# Patient Record
Sex: FEMALE | Race: WHITE | NOT HISPANIC OR LATINO | Employment: OTHER | URBAN - METROPOLITAN AREA
[De-identification: names, ages, dates, MRNs, and addresses within clinical notes are randomized per-mention and may not be internally consistent; named-entity substitution may affect disease eponyms.]

---

## 2017-03-13 ENCOUNTER — AMBULATORY - HEALTHEAST (OUTPATIENT)
Dept: CARE COORDINATION | Facility: CLINIC | Age: 82
End: 2017-03-13

## 2017-03-13 ENCOUNTER — AMBULATORY - HEALTHEAST (OUTPATIENT)
Dept: CARDIOLOGY | Facility: CLINIC | Age: 82
End: 2017-03-13

## 2017-03-13 DIAGNOSIS — Z95.0 PACEMAKER: ICD-10-CM

## 2017-03-14 ENCOUNTER — COMMUNICATION - HEALTHEAST (OUTPATIENT)
Dept: CARDIOLOGY | Facility: CLINIC | Age: 82
End: 2017-03-14

## 2017-03-14 ENCOUNTER — AMBULATORY - HEALTHEAST (OUTPATIENT)
Dept: CARE COORDINATION | Facility: CLINIC | Age: 82
End: 2017-03-14

## 2017-03-15 ENCOUNTER — AMBULATORY - HEALTHEAST (OUTPATIENT)
Dept: CARE COORDINATION | Facility: CLINIC | Age: 82
End: 2017-03-15

## 2017-06-19 ENCOUNTER — AMBULATORY - HEALTHEAST (OUTPATIENT)
Dept: CARDIOLOGY | Facility: CLINIC | Age: 82
End: 2017-06-19

## 2017-06-19 DIAGNOSIS — Z95.0 PACEMAKER: ICD-10-CM

## 2017-06-20 LAB — HCC DEVICE COMMENTS: NORMAL

## 2017-09-07 ENCOUNTER — AMBULATORY - HEALTHEAST (OUTPATIENT)
Dept: CARDIOLOGY | Facility: CLINIC | Age: 82
End: 2017-09-07

## 2017-09-07 ENCOUNTER — OFFICE VISIT - HEALTHEAST (OUTPATIENT)
Dept: CARDIOLOGY | Facility: CLINIC | Age: 82
End: 2017-09-07

## 2017-09-07 DIAGNOSIS — Z95.0 CARDIAC PACEMAKER IN SITU: ICD-10-CM

## 2017-09-07 DIAGNOSIS — I49.5 SINOATRIAL NODE DYSFUNCTION (H): ICD-10-CM

## 2017-09-07 DIAGNOSIS — I10 ESSENTIAL HYPERTENSION WITH GOAL BLOOD PRESSURE LESS THAN 140/90: ICD-10-CM

## 2017-09-07 DIAGNOSIS — E78.5 HYPERLIPIDEMIA LDL GOAL <100: ICD-10-CM

## 2017-09-07 ASSESSMENT — MIFFLIN-ST. JEOR: SCORE: 861.7

## 2017-09-11 LAB — HCC DEVICE COMMENTS: NORMAL

## 2017-12-06 ENCOUNTER — AMBULATORY - HEALTHEAST (OUTPATIENT)
Dept: CARDIOLOGY | Facility: CLINIC | Age: 82
End: 2017-12-06

## 2017-12-06 DIAGNOSIS — Z95.0 CARDIAC PACEMAKER IN SITU: ICD-10-CM

## 2017-12-06 LAB — HCC DEVICE COMMENTS: NORMAL

## 2018-03-15 ENCOUNTER — AMBULATORY - HEALTHEAST (OUTPATIENT)
Dept: CARDIOLOGY | Facility: CLINIC | Age: 83
End: 2018-03-15

## 2018-03-15 DIAGNOSIS — Z95.0 CARDIAC PACEMAKER IN SITU: ICD-10-CM

## 2018-03-16 LAB — HCC DEVICE COMMENTS: NORMAL

## 2018-04-04 ENCOUNTER — COMMUNICATION - HEALTHEAST (OUTPATIENT)
Dept: CARDIOLOGY | Facility: CLINIC | Age: 83
End: 2018-04-04

## 2018-04-16 ENCOUNTER — COMMUNICATION - HEALTHEAST (OUTPATIENT)
Dept: CARDIOLOGY | Facility: CLINIC | Age: 83
End: 2018-04-16

## 2018-06-20 ENCOUNTER — AMBULATORY - HEALTHEAST (OUTPATIENT)
Dept: CARDIOLOGY | Facility: CLINIC | Age: 83
End: 2018-06-20

## 2018-06-20 DIAGNOSIS — Z95.0 CARDIAC PACEMAKER IN SITU: ICD-10-CM

## 2018-06-21 LAB
HCC DEVICE COMMENTS: NORMAL
HCC DEVICE IMPLANTING PROVIDER: NORMAL
HCC DEVICE MANUFACTURE: NORMAL
HCC DEVICE MODEL: NORMAL
HCC DEVICE SERIAL NUMBER: NORMAL
HCC DEVICE TYPE: NORMAL

## 2018-09-13 ENCOUNTER — AMBULATORY - HEALTHEAST (OUTPATIENT)
Dept: CARDIOLOGY | Facility: CLINIC | Age: 83
End: 2018-09-13

## 2018-09-20 ENCOUNTER — AMBULATORY - HEALTHEAST (OUTPATIENT)
Dept: CARDIOLOGY | Facility: CLINIC | Age: 83
End: 2018-09-20

## 2018-09-20 ENCOUNTER — OFFICE VISIT - HEALTHEAST (OUTPATIENT)
Dept: CARDIOLOGY | Facility: CLINIC | Age: 83
End: 2018-09-20

## 2018-09-20 DIAGNOSIS — I10 ESSENTIAL HYPERTENSION: ICD-10-CM

## 2018-09-20 DIAGNOSIS — I49.5 SINOATRIAL NODE DYSFUNCTION (H): ICD-10-CM

## 2018-09-20 DIAGNOSIS — Z95.0 CARDIAC PACEMAKER IN SITU: ICD-10-CM

## 2018-09-20 DIAGNOSIS — I48.0 PAROXYSMAL ATRIAL FIBRILLATION (H): ICD-10-CM

## 2018-09-20 RX ORDER — FUROSEMIDE 20 MG
10 TABLET ORAL DAILY
Status: SHIPPED | COMMUNITY
Start: 2018-09-04

## 2018-09-20 ASSESSMENT — MIFFLIN-ST. JEOR: SCORE: 864.42

## 2018-10-04 ENCOUNTER — HOSPITAL ENCOUNTER (OUTPATIENT)
Dept: CARDIOLOGY | Facility: HOSPITAL | Age: 83
Discharge: HOME | End: 2018-10-04
Attending: INTERNAL MEDICINE

## 2018-10-04 DIAGNOSIS — I48.0 PAROXYSMAL ATRIAL FIBRILLATION (H): ICD-10-CM

## 2018-10-04 ASSESSMENT — MIFFLIN-ST. JEOR: SCORE: 864.42

## 2018-10-05 LAB
AORTIC ROOT: 3 CM
AORTIC VALVE MEAN VELOCITY: 115 CM/S
AV CUSP SEPERATION: 1.6 CM
AV CUSP SEPERATION: 1.6 CM
AV DIMENSIONLESS INDEX VTI: 0.5
AV MEAN GRADIENT: 6 MMHG
AV PEAK GRADIENT: 9.6 MMHG
AV VALVE AREA: 1.9 CM2
BSA FOR ECHO PROCEDURE: 1.54 M2
CV ECHO HEIGHT: 60 IN
CV ECHO WEIGHT: 123 LBS
DOP CALC AO PEAK VEL: 155 CM/S
DOP CALC AO VTI: 37.5 CM
DOP CALC LVOT AREA: 3.8 CM2
DOP CALC LVOT DIAMETER: 2.2 CM
DOP CALC LVOT STROKE VOLUME: 69.5 CM3
DOP CALC MV VTI: 30.2 CM
DOP CALCLVOT PEAK VEL VTI: 18.3 CM
EJECTION FRACTION: 57 % (ref 55–75)
FRACTIONAL SHORTENING: 30.4 % (ref 28–44)
INTERVENTRICULAR SEPTUM IN END DIASTOLE: 1.1 CM (ref 0.6–0.9)
IVS/PW RATIO: 0.9
LA AREA 1: 20 CM2
LA AREA 2: 18.7 CM2
LEFT ATRIUM LENGTH: 4.79 CM
LEFT ATRIUM SIZE: 3.5 CM
LEFT ATRIUM VOLUME INDEX: 43.1 ML/M2
LEFT ATRIUM VOLUME: 66.4 ML
LEFT VENTRICLE CARDIAC INDEX: 3.7 L/MIN/M2
LEFT VENTRICLE CARDIAC OUTPUT: 5.7 L/MIN
LEFT VENTRICLE DIASTOLIC VOLUME INDEX: 42.2 CM3/M2 (ref 34–74)
LEFT VENTRICLE DIASTOLIC VOLUME: 65 CM3 (ref 46–106)
LEFT VENTRICLE HEART RATE: 82 BPM
LEFT VENTRICLE MASS INDEX: 125.3 G/M2
LEFT VENTRICLE SYSTOLIC VOLUME INDEX: 18.2 CM3/M2 (ref 11–31)
LEFT VENTRICLE SYSTOLIC VOLUME: 28 CM3 (ref 14–42)
LEFT VENTRICULAR INTERNAL DIMENSION IN DIASTOLE: 4.6 CM (ref 3.8–5.2)
LEFT VENTRICULAR INTERNAL DIMENSION IN SYSTOLE: 3.2 CM (ref 2.2–3.5)
LEFT VENTRICULAR MASS: 192.9 G
LEFT VENTRICULAR OUTFLOW TRACT MEAN GRADIENT: 2 MMHG
LEFT VENTRICULAR OUTFLOW TRACT MEAN VELOCITY: 62.9 CM/S
LEFT VENTRICULAR POSTERIOR WALL IN END DIASTOLE: 1.2 CM (ref 0.6–0.9)
LV STROKE VOLUME INDEX: 45.1 ML/M2
MITRAL VALVE DECELERATION SLOPE: 3200 MM/S2
MITRAL VALVE E/A RATIO: 0.7
MITRAL VALVE MEAN INFLOW VELOCITY: 59.2 CM/S
MITRAL VALVE PEAK VELOCITY: 112 CM/S
MITRAL VALVE PRESSURE HALF-TIME: 73 MS
MR PISA RADIUS: 0.5 CM
MR PISA VN NYQUIST: 30.8 CM/S
MV AREA VTI: 2.3 CM2
MV AVERAGE E/E' RATIO: 17.8 CM/S
MV DECELERATION TIME: 169 MS
MV E'TISSUE VEL-LAT: 4.68 CM/S
MV E'TISSUE VEL-MED: 3.41 CM/S
MV LATERAL E/E' RATIO: 15.4
MV MEAN GRADIENT: 2 MMHG
MV MEDIAL E/E' RATIO: 21.1
MV PEAK A VELOCITY: 106 CM/S
MV PEAK E VELOCITY: 72.1 CM/S
MV PEAK GRADIENT: 5 MMHG
MV VALVE AREA BY CONTINUITY EQUATION: 2.3 CM2
MV VALVE AREA PRESSURE 1/2 METHOD: 3 CM2
NUC REST DIASTOLIC VOLUME INDEX: 1968 LBS
NUC REST SYSTOLIC VOLUME INDEX: 60 IN
TRICUSPID REGURGITATION PEAK PRESSURE GRADIENT: 25 MMHG
TRICUSPID VALVE ANULAR PLANE SYSTOLIC EXCURSION: 1.8 CM
TRICUSPID VALVE PEAK REGURGITANT VELOCITY: 250 CM/S

## 2018-10-10 ENCOUNTER — COMMUNICATION - HEALTHEAST (OUTPATIENT)
Dept: CARDIOLOGY | Facility: CLINIC | Age: 83
End: 2018-10-10

## 2018-12-19 ENCOUNTER — AMBULATORY - HEALTHEAST (OUTPATIENT)
Dept: CARDIOLOGY | Facility: CLINIC | Age: 83
End: 2018-12-19

## 2018-12-19 DIAGNOSIS — Z95.0 CARDIAC PACEMAKER IN SITU: ICD-10-CM

## 2019-03-26 ENCOUNTER — AMBULATORY - HEALTHEAST (OUTPATIENT)
Dept: CARDIOLOGY | Facility: CLINIC | Age: 84
End: 2019-03-26

## 2019-03-26 DIAGNOSIS — Z95.0 CARDIAC PACEMAKER IN SITU: ICD-10-CM

## 2019-06-27 ENCOUNTER — AMBULATORY - HEALTHEAST (OUTPATIENT)
Dept: CARDIOLOGY | Facility: CLINIC | Age: 84
End: 2019-06-27

## 2019-06-27 DIAGNOSIS — Z95.0 CARDIAC PACEMAKER IN SITU: ICD-10-CM

## 2019-08-30 ENCOUNTER — AMBULATORY - HEALTHEAST (OUTPATIENT)
Dept: CARDIOLOGY | Facility: CLINIC | Age: 84
End: 2019-08-30

## 2019-09-09 ENCOUNTER — AMBULATORY - HEALTHEAST (OUTPATIENT)
Dept: CARDIOLOGY | Facility: CLINIC | Age: 84
End: 2019-09-09

## 2019-09-09 ENCOUNTER — OFFICE VISIT - HEALTHEAST (OUTPATIENT)
Dept: CARDIOLOGY | Facility: CLINIC | Age: 84
End: 2019-09-09

## 2019-09-09 DIAGNOSIS — I10 ESSENTIAL HYPERTENSION: ICD-10-CM

## 2019-09-09 DIAGNOSIS — I49.5 SINOATRIAL NODE DYSFUNCTION (H): ICD-10-CM

## 2019-09-09 DIAGNOSIS — Z95.0 CARDIAC PACEMAKER IN SITU: ICD-10-CM

## 2019-09-09 ASSESSMENT — MIFFLIN-ST. JEOR
SCORE: 891.64
SCORE: 891.64

## 2019-10-24 ENCOUNTER — SURGERY - HEALTHEAST (OUTPATIENT)
Dept: GASTROENTEROLOGY | Facility: HOSPITAL | Age: 84
End: 2019-10-24

## 2019-10-24 ASSESSMENT — MIFFLIN-ST. JEOR: SCORE: 896.18

## 2019-12-04 ENCOUNTER — AMBULATORY - HEALTHEAST (OUTPATIENT)
Dept: CARDIOLOGY | Facility: CLINIC | Age: 84
End: 2019-12-04

## 2019-12-04 DIAGNOSIS — I49.5 SINOATRIAL NODE DYSFUNCTION (H): ICD-10-CM

## 2019-12-04 DIAGNOSIS — Z95.0 CARDIAC PACEMAKER IN SITU: ICD-10-CM

## 2020-03-05 ENCOUNTER — AMBULATORY - HEALTHEAST (OUTPATIENT)
Dept: CARDIOLOGY | Facility: CLINIC | Age: 85
End: 2020-03-05

## 2020-03-05 DIAGNOSIS — Z95.0 CARDIAC PACEMAKER IN SITU: ICD-10-CM

## 2020-03-05 DIAGNOSIS — I49.5 SINOATRIAL NODE DYSFUNCTION (H): ICD-10-CM

## 2020-06-09 ENCOUNTER — AMBULATORY - HEALTHEAST (OUTPATIENT)
Dept: CARDIOLOGY | Facility: CLINIC | Age: 85
End: 2020-06-09

## 2020-06-09 DIAGNOSIS — I49.5 SINOATRIAL NODE DYSFUNCTION (H): ICD-10-CM

## 2020-06-09 DIAGNOSIS — Z95.0 CARDIAC PACEMAKER IN SITU: ICD-10-CM

## 2020-07-14 ENCOUNTER — COMMUNICATION - HEALTHEAST (OUTPATIENT)
Dept: ADMINISTRATIVE | Facility: CLINIC | Age: 85
End: 2020-07-14

## 2020-10-14 ENCOUNTER — AMBULATORY - HEALTHEAST (OUTPATIENT)
Dept: CARDIOLOGY | Facility: CLINIC | Age: 85
End: 2020-10-14

## 2020-10-14 DIAGNOSIS — Z95.0 CARDIAC PACEMAKER IN SITU: ICD-10-CM

## 2020-10-14 DIAGNOSIS — I49.5 SINOATRIAL NODE DYSFUNCTION (H): ICD-10-CM

## 2021-01-14 ENCOUNTER — AMBULATORY - HEALTHEAST (OUTPATIENT)
Dept: CARDIOLOGY | Facility: CLINIC | Age: 86
End: 2021-01-14

## 2021-01-14 DIAGNOSIS — Z95.0 CARDIAC PACEMAKER IN SITU: ICD-10-CM

## 2021-01-14 DIAGNOSIS — I49.5 SINOATRIAL NODE DYSFUNCTION (H): ICD-10-CM

## 2021-04-16 ENCOUNTER — AMBULATORY - HEALTHEAST (OUTPATIENT)
Dept: CARDIOLOGY | Facility: CLINIC | Age: 86
End: 2021-04-16

## 2021-04-16 DIAGNOSIS — I49.5 SINOATRIAL NODE DYSFUNCTION (H): ICD-10-CM

## 2021-04-16 DIAGNOSIS — Z95.0 CARDIAC PACEMAKER IN SITU: ICD-10-CM

## 2021-05-14 ENCOUNTER — AMBULATORY - HEALTHEAST (OUTPATIENT)
Dept: CARDIOLOGY | Facility: CLINIC | Age: 86
End: 2021-05-14

## 2021-05-14 DIAGNOSIS — Z95.0 CARDIAC PACEMAKER IN SITU: ICD-10-CM

## 2021-05-14 DIAGNOSIS — I49.5 SINOATRIAL NODE DYSFUNCTION (H): ICD-10-CM

## 2021-05-14 RX ORDER — PANTOPRAZOLE SODIUM 20 MG/1
1 TABLET, DELAYED RELEASE ORAL DAILY
Status: SHIPPED | COMMUNITY
Start: 2021-03-29

## 2021-05-14 RX ORDER — MIRTAZAPINE 7.5 MG/1
1 TABLET, FILM COATED ORAL DAILY
Status: SHIPPED | COMMUNITY
Start: 2021-03-19

## 2021-05-14 ASSESSMENT — MIFFLIN-ST. JEOR: SCORE: 914.32

## 2021-05-27 VITALS
BODY MASS INDEX: 26.31 KG/M2 | RESPIRATION RATE: 16 BRPM | HEIGHT: 60 IN | SYSTOLIC BLOOD PRESSURE: 142 MMHG | WEIGHT: 134 LBS | HEART RATE: 72 BPM | DIASTOLIC BLOOD PRESSURE: 72 MMHG

## 2021-05-31 VITALS — BODY MASS INDEX: 24.03 KG/M2 | WEIGHT: 122.4 LBS | HEIGHT: 60 IN

## 2021-06-01 NOTE — PROGRESS NOTES
[facility] Progress Note    Assessment:  1.  Sick sinus syndrome with pacemaker implantation.  Clinically doing well normal pacemaker function.  She has had previous infrequent episodes of atrial fibrillation with documented 4-hour episode.  On this occasion she really has not had any significant atrial fibrillation.  She has had rare branches of nonsustained ventricular tachycardia.  We pursued an echocardiogram 1 year ago that demonstrated normal systolic function.  We did discuss whether we would pursue nuclear stress testing.  Her preference was not to pursue additional testing at this time.    2.Hypertension.  Blood pressure was elevated upon arrival but improved after she is been seated rechecked.  She does have her pressure monitored at her place as of residence.      Plan: Follow-up 1 year with plans as outlined above.    1. Hypertension     2. Sick Sinus Syndrome     3. Cardiac pacemaker in situ           An After Visit Summary was printed and given to the patient.    Subjective:    Destinee Green is a 89 y.o. female who returned for a planned  follow up visit.  She is here for follow-up pacemaker check.  Her pacemaker interrogation today demonstrates normal pacemaker function.  She has had a rare occurrence of tacky arrhythmia.  There was one report of 18 beats of faster heart rhythm possibly VT March 5, 2019.  She has had no complaints of chest discomfort, shortness of breath, orthopnea, dizziness, lightheadedness, syncope or near syncope.  She did undergo echocardiography October 2019 that demonstrated preserved systolic function and no significant valve abnormality.  I have reviewed notes and laboratory studies from her primary care physician's office.  Reviewed her medications as there has been some medication changes by her primary care physician.  She is no longer taking statins at the recommendation of her primary care physician.    Review of Systems:                                               Problem List:    Patient Active Problem List   Diagnosis     Hyperlipidemia LDL goal <100     Hypertension     Sick Sinus Syndrome     Cardiac pacemaker in situ       Social History     Socioeconomic History     Marital status: Single     Spouse name: Not on file     Number of children: Not on file     Years of education: Not on file     Highest education level: Not on file   Occupational History     Not on file   Social Needs     Financial resource strain: Not on file     Food insecurity:     Worry: Not on file     Inability: Not on file     Transportation needs:     Medical: Not on file     Non-medical: Not on file   Tobacco Use     Smoking status: Former Smoker     Smokeless tobacco: Never Used   Substance and Sexual Activity     Alcohol use: Not on file     Drug use: Not on file     Sexual activity: Not on file   Lifestyle     Physical activity:     Days per week: Not on file     Minutes per session: Not on file     Stress: Not on file   Relationships     Social connections:     Talks on phone: Not on file     Gets together: Not on file     Attends Sikh service: Not on file     Active member of club or organization: Not on file     Attends meetings of clubs or organizations: Not on file     Relationship status: Not on file     Intimate partner violence:     Fear of current or ex partner: Not on file     Emotionally abused: Not on file     Physically abused: Not on file     Forced sexual activity: Not on file   Other Topics Concern     Not on file   Social History Narrative     Not on file       No family history on file.    Current Outpatient Medications   Medication Sig Dispense Refill     ascorbic acid (ASCORBIC ACID WITH TEO HIPS) 500 MG tablet Take 500 mg by mouth daily.       aspirin 81 MG EC tablet Take 81 mg by mouth daily.       CALCIUM CARBONATE (CALCIUM 500 ORAL) Take 500 mg by mouth 2 (two) times a day.       ferrous sulfate 325 (65 FE) MG tablet Take 325 mg by mouth 3 (three)  times a week.        furosemide (LASIX) 20 MG tablet Take 20 mg by mouth daily.              metoprolol succinate (TOPROL-XL) 100 MG 24 hr tablet Take 100 mg by mouth daily.       VIT C/E/ZN/COPPR/LUTEIN/ZEAXAN (PRESERVISION AREDS 2 ORAL) Take by mouth 2 (two) times a day.       atenolol (TENORMIN) 50 MG tablet Take 50 mg by mouth daily.       hydrALAZINE (APRESOLINE) 25 MG tablet Take 25 mg by mouth 3 (three) times a day.       hydroCHLOROthiazide (HYDRODIURIL) 25 MG tablet Take 25 mg by mouth daily.       hydrochlorothiazide (MICROZIDE) 12.5 mg capsule Take 12.5 mg by mouth daily.       multivitamin with minerals (VITAMINS & MINERALS) tablet Take by mouth daily.       potassium chloride (K-DUR,KLOR-CON) 20 MEQ tablet Take 20 mEq by mouth daily.       raloxifene (EVISTA) 60 mg tablet Take 60 mg by mouth daily.       simvastatin (ZOCOR) 20 MG tablet Take 20 mg by mouth daily.       No current facility-administered medications for this visit.        Objective:     /70 (Patient Site: Left Arm, Patient Position: Sitting, Cuff Size: Adult Regular)   Pulse 69   Resp 14   Ht 5' (1.524 m)   Wt 129 lb (58.5 kg)   BMI 25.19 kg/m    129 lb (58.5 kg)   140/70    Physical Exam:    GENERAL APPEARANCE: alert, no apparent distress  HEENT: no scleral icterus or xanthelasma  NECK: jugular venous pressure not obviously elevated although examined in the chair.  CHEST: symmetric, the lungs are clear to auscultation, pacemaker site well-healed  CARDIOVASCULAR: regular rhythm, soft systolic murmur, click, or gallop; no carotid bruits  Abdomen: No Organomegaly, masses, bruits, or tenderness. Bowels sounds are present      EXTREMITIES: no cyanosis, clubbing, trace edema she has a port hose on that are thigh-high and she did not wish to have them removed but she is reporting some intermittent discoloration of her foot right greater than left without pain.  My examination suggests some mild coloration likely related to venous  sufficiency.    Cardiac Testing:  Echo Complete   Order# [id number]   Reading physician: Kendall Ho MD Ordering physician: Kumar Timmons MD Study date: 10/4/18   Performing Date Performing Department   Oct 4, 2018  CARDIAC TESTING [[id number]]   Patient Information     Patient Name  Destinee Green MRN  [id number] Sex  Female              Age  1923 (89 y.o.)   Indications     Dx: Paroxysmal atrial fibrillation (H) [I48.0 (ICD-10-CM)]   Summary       When compared to the previous study dated 2016, no significant change.    Left ventricle ejection fraction is normal. The calculated left ventricular ejection fraction is 57%.    Normal left ventricular size and systolic function.    Normal right ventricular size and systolic function.    Mild mitral regurgitation    Pacemaker lead in right heart chambers              Lab Results:  2019 outside hospital 142, potassium 4.9, chloride 106, CO2 29, BUN 31, creatinine 0.89 white count 8.3, hemoglobin 14.8 platelets 266            This note has been dictated using voice recognition software. Any grammatical or context distortions are unintentional and inherent to the software.      Kumar Timmons M.D., F.A.C.[facility]  [phone]

## 2021-06-01 NOTE — PATIENT INSTRUCTIONS - HE
Please call my nurse Shannan with any heart related questions.Her number is [phone].Please call if blood pressure is consistently higher than 140/90.Please have blood pressure checked after being seated for at least 10 minutes.

## 2021-06-02 VITALS — WEIGHT: 123 LBS | BODY MASS INDEX: 24.15 KG/M2 | HEIGHT: 60 IN

## 2021-06-02 VITALS — WEIGHT: 123 LBS | HEIGHT: 60 IN | BODY MASS INDEX: 24.15 KG/M2

## 2021-06-03 VITALS
DIASTOLIC BLOOD PRESSURE: 70 MMHG | BODY MASS INDEX: 25.32 KG/M2 | SYSTOLIC BLOOD PRESSURE: 160 MMHG | WEIGHT: 129 LBS | HEART RATE: 69 BPM | HEIGHT: 60 IN | RESPIRATION RATE: 14 BRPM

## 2021-06-03 VITALS
HEART RATE: 69 BPM | BODY MASS INDEX: 25.32 KG/M2 | RESPIRATION RATE: 14 BRPM | HEIGHT: 60 IN | WEIGHT: 129 LBS | SYSTOLIC BLOOD PRESSURE: 160 MMHG | DIASTOLIC BLOOD PRESSURE: 70 MMHG

## 2021-06-03 VITALS — WEIGHT: 130 LBS | HEIGHT: 60 IN | BODY MASS INDEX: 25.52 KG/M2

## 2021-06-12 NOTE — PROGRESS NOTES
In clinic device check with Device Nurse, followed by clinic follow-up with Dr. Timmons.  Please see link for full device report.  Patient was informed of results and next follow up during today's visit..

## 2021-06-12 NOTE — PROGRESS NOTES
[facility] Progress Note    Assessment:  1.  Sick sinus syndrome status post pacemaker implantation in 2013.  Clinically doing well.  Pacemaker check today demonstrates normal pacemaker function.  She has infrequent episodes of nonsustained ventricular tachycardia that have been previously noted.  She had an echocardiogram approximately 1 year ago that demonstrated normal left ventricular systolic function.  We discussed the potential for stress testing to exclude occult ischemia related to this finding but she continues to be reluctant to consider stress testing and will continue with her current combination of medications.  She is asked to update us with complaints of symptomatic dizziness or palpitations.  She is asked to update us with any progressive change in exercise tolerance.    2.  Hypertension blood pressure appears to be under good control with her current combination of medications.  She is asked to have her blood pressure monitored periodically.      3.  Hyperlipidemia.  The most recent lipid studies are reviewed from her primary care physician's office.  This was completed recently September 1, 2017 with an LDL cholesterol 64.  We discussed whether we would discontinue simvastatin as she is on it for primary prevention but her references to remain on her current combination of medications.        Plan: As outlined above with plan follow-up in 6 months    1. Cardiac pacemaker in situ     2. Hyperlipidemia LDL goal <100     3. Essential hypertension with goal blood pressure less than 140/90     4. Sick Sinus Syndrome           An After Visit Summary was printed and given to the patient.    Subjective:    Destinee Green is a 89 y.o. female who returned for a planned  follow up.  She reports that overall she feels well.  She has noted mild decrease in her exercise tolerance and has some gait instability but has not fallen.  She denies chest discomfort, orthopnea, or PND.  Pacemaker  check today demonstrates normal pacemaker function.  She has had infrequent nonsustained ventricular tachycardic events.  These have been asymptomatic.  One year ago we pursued an echocardiogram that demonstrated normal left ventricular systolic function.  We discussed the possibility of stress testing to exclude ischemia both previously and again today but at this time she remains reluctant given her asymptomatic state.  Recent laboratory studies from her primary care physician's office are reviewed.  History profile was within normal limits.  CBC within normal limits.  Liver function tests were within normal limits and total cholesterol 143 with an LDL of 64.  We did discuss the potential for stopping statin given her age and primary prevention but her preference is to remain on her medications.  She reports her blood pressures have been under reasonable control.    Review of Systems:   General: WNL  Eyes: WNL  Ears/Nose/Throat: WNL  Lungs: WNL  Heart: Shortness of Breath with activity  Stomach: WNL  Bladder: WNL  Muscle/Joints: Joint Pain, Muscle Pain  Skin: WNL  Nervous System: Daytime Sleepiness  Mental Health: WNL     Blood: Easy Bruising      Problem List:    Patient Active Problem List   Diagnosis     Hyperlipidemia LDL goal <100     Hypertension     Sick Sinus Syndrome     Cardiac pacemaker in situ       Social History     Social History     Marital status: Single     Spouse name: N/A     Number of children: N/A     Years of education: N/A     Occupational History     Not on file.     Social History Main Topics     Smoking status: Former Smoker     Smokeless tobacco: Not on file     Alcohol use Not on file     Drug use: Not on file     Sexual activity: Not on file     Other Topics Concern     Not on file     Social History Narrative       No family history on file.    Current Outpatient Prescriptions   Medication Sig Dispense Refill     ascorbic acid (ASCORBIC ACID WITH TEO HIPS) 500 MG tablet Take 500 mg  by mouth daily.       aspirin 81 MG EC tablet Take 81 mg by mouth daily.       atenolol (TENORMIN) 50 MG tablet Take 50 mg by mouth daily.       CALCIUM CARBONATE (CALCIUM 500 ORAL) Take 500 mg by mouth 2 (two) times a day.       ferrous sulfate 325 (65 FE) MG tablet Take 325 mg by mouth 3 (three) times a week.        hydrALAZINE (APRESOLINE) 25 MG tablet Take 25 mg by mouth 3 (three) times a day.       hydroCHLOROthiazide (HYDRODIURIL) 25 MG tablet Take 25 mg by mouth daily.       raloxifene (EVISTA) 60 mg tablet Take 60 mg by mouth daily.       simvastatin (ZOCOR) 20 MG tablet Take 20 mg by mouth daily.       VIT C/E/ZN/COPPR/LUTEIN/ZEAXAN (PRESERVISION AREDS 2 ORAL) Take by mouth 2 (two) times a day.       hydrochlorothiazide (MICROZIDE) 12.5 mg capsule Take 12.5 mg by mouth daily.       multivitamin with minerals (VITAMINS & MINERALS) tablet Take by mouth daily.       No current facility-administered medications for this visit.        Objective:     /58 (Patient Site: Left Arm, Patient Position: Sitting, Cuff Size: Adult Regular)  Pulse 84  Resp 16  Ht 5' (1.524 m)  Wt 122 lb 6.4 oz (55.5 kg)  BMI 23.9 kg/m2  122 lb 6.4 oz (55.5 kg)       Physical Exam:    GENERAL APPEARANCE: alert, no apparent distress  HEENT: no scleral icterus or xanthelasma  NECK: jugular venous pressure within normal limits  CHEST: symmetric, the lungs are clear to auscultation  CARDIOVASCULAR: regular rhythm with soft systolic murmur, click, or gallop; no carotid bruits, pacemaker site well-healed  Abdomen: No Organomegaly, masses, bruits, or tenderness. Bowels sounds are present      EXTREMITIES: no cyanosis, clubbing or edema    Cardiac Testing:  Procedure Date  Date: 09/26/2016 Start: 01:14 AM End: 01:35 AM     Study Location: [facility]  Technical Quality: Adequate visualization     Patient Status: Routine     Height: 60 inches Weight: 127 pounds BSA: 1.54 m^2 BMI: 24.8 kg/m^2     HR: 60 bpm BP: 170/80 mmHg     Allergies    -  No known allergies.     Indications  Hypertension and sick sinus syndrome.      Conclusions       Summary   1. Normal left ventricular size and systolic performance. The ejection   fraction is estimated to be 55%.   2. No significant valvular heart disease is identified on this study.   3. The left atrium is mildly enlarged.       When compared to the prior real-time echocardiogram dated 14 December 2012, there has been no major interval change.    IMPRESSION:   CONCLUSION:  1.  Mild atheromatous plaque in the carotid arteries.  2.  No significant stenosis on either side.     Evaluation based on velocities and NASCET criteria.           Lab Results:    No results found for: NA, K, CL, CO2, BUN, CREATININE, GLUCOSE, CALCIUM  No results found for: CHOL, TRIG, HDL, LDLDIRECT  No results found for: BNP  No results found for: CREATININE  No results found for: LDLCALC  No results found for: WBC, HGB, HCT, MCV, PLT            This note has been dictated using voice recognition software. Any grammatical or context distortions are unintentional and inherent to the software.      Kumar Timmons M.D., F.A.C.C.  [facility]  [phone]

## 2021-06-16 PROBLEM — Z95.0 CARDIAC PACEMAKER IN SITU: Status: ACTIVE | Noted: 2017-09-07

## 2021-06-17 NOTE — PROGRESS NOTES
In clinic device check.  Please see link for full device report.  Patient was informed of results and next follow up during today's visit.    CHRISTI AndersonN, RN, CV-BC  Device Nurse  [facility]

## 2021-06-19 NOTE — LETTER
Letter by Xochitl Richey RDCS at      Author: Xochitl Richey RDCS Service: -- Author Type: --    Filed:  Encounter Date: 3/26/2019 Status: (Other)         Destinee Green  [address]  [address]  [place] [address]      March 26, 2019      Dear Ms. Green,    RE: Remote Results    We are writing to you regarding your recent Remote Pacemaker check from home. Your transmission was received successfully. Battery status is satisfactory at this time.     Your results are being reviewed.    Your next device appointment will be a remote check on June 27, 2019.  You can choose the time of day you wish to transmit.    To schedule or reschedule, please call [phone] and press 1.    NOTE: If you would like to do an extra transmission, please call [phone] and press 3 to speak to a nurse BEFORE transmitting. This ensures that the Device Clinic staff is aware of the reason you are sending a transmission, and can follow-up with you after it has been reviewed.    We will be checking your implanted device from home (remotely) every three months unless otherwise instructed. We will need to see you in the clinic at least once a year. You may need to be seen in the clinic sooner depending on the results of your check.    Please be aware:    The follow-up schedule is like a Physician prescription.    Your remote monitor is paired to your specific implanted device.      Sincerely,    [facility] Device Clinic

## 2021-06-19 NOTE — LETTER
Letter by Марина Love EPS at      Author: Марина Love EPS Service: -- Author Type: --    Filed:  Encounter Date: 6/27/2019 Status: (Other)         Destinee Green  [address]  [address]  [place] [address]      June 27, 2019      Dear Ms. Green,    RE: Remote Results    We are writing to you regarding your recent Remote Pacemaker check from home. Your transmission was received successfully. Battery status is satisfactory at this time.     Your results are within normal limits.    Your next device appointment will be a clinic visit.  Please call in July to schedule.      To schedule or reschedule, please call [phone] and press 1.    NOTE: If you would like to do an extra transmission, please call [phone] and press 3 to speak to a nurse BEFORE transmitting. This ensures that the Device Clinic staff is aware of the reason you are sending a transmission, and can follow-up with you after it has been reviewed.    We will be checking your implanted device from home (remotely) every three months unless otherwise instructed. We will need to see you in the clinic at least once a year. You may need to be seen in the clinic sooner depending on the results of your check.    Please be aware:    The follow-up schedule is like a Physician prescription.    Your remote monitor is paired to your specific implanted device.      Sincerely,    [facility] Device Clinic

## 2021-06-19 NOTE — LETTER
Letter by Meagan Miller at      Author: Meagan Miller Service: -- Author Type: --    Filed:  Encounter Date: 12/4/2019 Status: Signed         Destinee Green  [address] [place] [id number]      December 4, 2019      Dear MsCydney Green,    RE: Remote Results    We are writing to you regarding your recent Remote Pacemaker check from home. Your transmission was received successfully. Battery status is satisfactory at this time.     Your results are showing no significant changes.    Your next device appointment will be a remote check on March 5, 2020.  You can choose the time of day you wish to transmit.    To schedule or reschedule, please call [phone] and press 1.    NOTE: If you would like to do an extra transmission, please call [phone] and press 3 to speak to a nurse BEFORE transmitting. This ensures that the Device Clinic staff is aware of the reason you are sending a transmission, and can follow-up with you after it has been reviewed.    We will be checking your implanted device from home (remotely) every three months unless otherwise instructed. We will need to see you in the clinic at least once a year. You may need to be seen in the clinic sooner depending on the results of your check.    Please be aware:    The follow-up schedule is like a Physician prescription.    Your remote monitor is paired to your specific implanted device.      Sincerely,    [facility] Device Clinic

## 2021-06-20 NOTE — PROGRESS NOTES
In clinic device check with Dr. Timmons.  Please see link for full device report.  Patient was informed of results and next follow up during today's visit.

## 2021-06-20 NOTE — PROGRESS NOTES
[facility] Progress Note    Assessment:  1.  Sick sinus syndrome with pacemaker implantation 2013.  Clinically doing well.  Pacemaker interrogation is outlined below.  She had one 4-hour episode of atrial fibrillation January 2018 with otherwise we will and ventricular paced rhythm.  She does ventricular paced 99% of the time.  We talked about the risk of stroke associated with atrial fibrillation.  We discussed that currently she has a very infrequent occurrence or documentation of atrial fibrillation.  Her preference is continue with her current medication regimen which includes an 81 mg aspirin.  She is aware of the potential increased risk of stroke and will reconsider her options if she has documented more frequent episodes of atrial fibrillation.  She is not aware clinically of atrial fibrillation.    2.  Hypertension.  Blood pressures appear to be under good control.  I note that she was changed to furosemide off hydrochlorothiazide.    3.  Dyslipidemia.  She has been off statins at her advanced age and recommendation of her primary care physician.      Plan: Follow-up 1 year with planned echocardiogram to reevaluate left ventricular function given 99% ventricular paced.    1. Paroxysmal atrial fibrillation (H)  Echo Complete   2. Hypertension     3. Sick Sinus Syndrome     4. Cardiac pacemaker in situ           An After Visit Summary was printed and given to the patient.    Subjective:    Destinee Green is a 89 y.o. female who returned for a planned  follow up visit.  She is here for her yearly pacemaker check.  She tells me she is been feeling well.  She did have an issue with a respiratory infection in April 2018 and she took a while to recover from this.  She tells me that she is still in the transitional care unit but is feeling well.  She denies chest pain, dizziness, significant shortness of breath, orthopnea or PND.  Pacemaker interrogation today demonstrates 1 4-hour episode of  atrial fibrillation January 2018 and one 9 beat nonsustained ventricular tachycardia October 2017.  Most recent echocardiogram is from September 2018 where left ventricular function was normal.  Discussed in the past and again today the risk of stroke associated with atrial fibrillation.  She has had very infrequent atrial fibrillation but did have a 4-hour episode which does give her some essential increased risk of stroke.  We talked about anticoagulation and we talked about the watchman device.  Her preference was to remain on aspirin and monitor for more frequent episodes of atrial fibrillation which I think seems reasonable.    Review of Systems:   General: WNL  Eyes: WNL  Ears/Nose/Throat: WNL  Lungs: WNL  Heart: WNL  Stomach: WNL  Bladder: WNL  Muscle/Joints: WNL  Skin: WNL  Nervous System: WNL  Mental Health: WNL     Blood: WNL      Problem List:    Patient Active Problem List   Diagnosis     Hyperlipidemia LDL goal <100     Hypertension     Sick Sinus Syndrome     Cardiac pacemaker in situ       Social History     Social History     Marital status: Single     Spouse name: N/A     Number of children: N/A     Years of education: N/A     Occupational History     Not on file.     Social History Main Topics     Smoking status: Former Smoker     Smokeless tobacco: Never Used     Alcohol use Not on file     Drug use: Not on file     Sexual activity: Not on file     Other Topics Concern     Not on file     Social History Narrative       No family history on file.    Current Outpatient Prescriptions   Medication Sig Dispense Refill     ascorbic acid (ASCORBIC ACID WITH TEO HIPS) 500 MG tablet Take 500 mg by mouth daily.       aspirin 81 MG EC tablet Take 81 mg by mouth daily.       CALCIUM CARBONATE (CALCIUM 500 ORAL) Take 500 mg by mouth 2 (two) times a day.       ferrous sulfate 325 (65 FE) MG tablet Take 325 mg by mouth 3 (three) times a week.        furosemide (LASIX) 20 MG tablet Take 20 mg by mouth daily.        multivitamin with minerals (VITAMINS & MINERALS) tablet Take by mouth daily.       potassium chloride (K-DUR,KLOR-CON) 20 MEQ tablet Take 20 mEq by mouth daily.       VIT C/E/ZN/COPPR/LUTEIN/ZEAXAN (PRESERVISION AREDS 2 ORAL) Take by mouth 2 (two) times a day.       atenolol (TENORMIN) 50 MG tablet Take 50 mg by mouth daily.       hydrALAZINE (APRESOLINE) 25 MG tablet Take 25 mg by mouth 3 (three) times a day.       hydroCHLOROthiazide (HYDRODIURIL) 25 MG tablet Take 25 mg by mouth daily.       hydrochlorothiazide (MICROZIDE) 12.5 mg capsule Take 12.5 mg by mouth daily.       raloxifene (EVISTA) 60 mg tablet Take 60 mg by mouth daily.       simvastatin (ZOCOR) 20 MG tablet Take 20 mg by mouth daily.       No current facility-administered medications for this visit.        Objective:     /70 (Patient Site: Right Arm, Patient Position: Sitting, Cuff Size: Adult Regular)  Pulse 72  Resp 16  Ht 5' (1.524 m)  Wt 123 lb (55.8 kg)  BMI 24.02 kg/m2  123 lb (55.8 kg)       Physical Exam:    GENERAL APPEARANCE: alert, no apparent distress  HEENT: no scleral icterus or xanthelasma  NECK: jugular venous pressure normal limits  CHEST: symmetric, the lungs are clear to auscultation  CARDIOVASCULAR: regular rhythm with soft systolic murmur; no carotid bruits  Abdomen: No Organomegaly, masses, bruits, or tenderness. Bowels sounds are present      EXTREMITIES: no cyanosis, clubbing mild edema    Cardiac Testing:  Procedure Date  Date: 09/26/2016 Start: 01:14 AM End: 01:35 AM     Study Location: [facility]  Technical Quality: Adequate visualization     Patient Status: Routine     Height: 60 inches Weight: 127 pounds BSA: 1.54 m^2 BMI: 24.8 kg/m^2     HR: 60 bpm BP: 170/80 mmHg     Allergies    - No known allergies.     Indications  Hypertension and sick sinus syndrome.      Conclusions       Summary   1. Normal left ventricular size and systolic performance. The ejection   fraction is estimated to be 55%.   2. No  significant valvular heart disease is identified on this study.   3. The left atrium is mildly enlarged.       When compared to the prior real-time echocardiogram dated 14 December 2012, there has been no major interval change.    Lab Results:    No results found for: NA, K, CL, CO2, BUN, CREATININE, GLUCOSE, CALCIUM  No results found for: CHOL, TRIG, HDL, LDLDIRECT  No results found for: BNP  No results found for: CREATININE  No results found for: LDLCALC  No results found for: WBC, HGB, HCT, MCV, PLT            This note has been dictated using voice recognition software. Any grammatical or context distortions are unintentional and inherent to the software.      Kumar Timmons M.D., F.A.C..  [facility]  [phone]

## 2021-06-20 NOTE — LETTER
Letter by Kumar Timmons MD at      Author: Kumar Timmons MD Service: -- Author Type: --    Filed:  Encounter Date: 7/14/2020 Status: (Other)         Destinee Green  [address] [place] [id number]      July 14, 2020      Dear Ms. Peter,    RE: Remote Results    We are writing to you regarding your recent Remote Pacemaker check from home. Your transmission was received successfully. Battery status is satisfactory at this time.     Your results are being reviewed.  You will be contacted if further information is necessary.     Your next device appointment will be a remote check on 10/14/2020.  You can choose the time of day you wish to transmit.    To schedule or reschedule, please call [phone] and press 1.    NOTE: If you would like to do an extra transmission, please call [phone] and press 3 to speak to a nurse BEFORE transmitting. This ensures that the Device Clinic staff is aware of the reason you are sending a transmission, and can follow-up with you after it has been reviewed.    We will be checking your implanted device from home (remotely) every three months unless otherwise instructed. We will need to see you in the clinic at least once a year. You may need to be seen in the clinic sooner depending on the results of your check.    Please be aware:    The follow-up schedule is like a Physician prescription.    Your remote monitor is paired to your specific implanted device.      Sincerely,    [facility] Device [facility]

## 2021-06-20 NOTE — LETTER
Letter by Cecilia Clancy RN at      Author: Cecilia Clancy RN Service: -- Author Type: --    Filed:  Encounter Date: 6/9/2020 Status: (Other)         Destinee Green  [address]  [address] [place] [address]      June 9, 2020      Dear Ms. Green,    RE: Remote Results    We are writing to you regarding your recent Remote Pacemaker check from home. Your transmission was received successfully. Battery status is satisfactory at this time.     Your results are being reviewed.  You will be contacted if further information is necessary.     Your next device appointment will be a clinic visit.  Please call in August to schedule.      To schedule or reschedule, please call [phone] and press 1.    NOTE: If you would like to do an extra transmission, please call [phone] and press 3 to speak to a nurse BEFORE transmitting. This ensures that the Device Clinic staff is aware of the reason you are sending a transmission, and can follow-up with you after it has been reviewed.    We will be checking your implanted device from home (remotely) every three months unless otherwise instructed. We will need to see you in the clinic at least once a year. You may need to be seen in the clinic sooner depending on the results of your check.    Please be aware:    The follow-up schedule is like a Physician prescription.    Your remote monitor is paired to your specific implanted device.      Sincerely,    [facility] Device Clinic

## 2021-06-21 NOTE — LETTER
Letter by Meagan Miller at      Author: Meagan Miller Service: -- Author Type: --    Filed:  Encounter Date: 4/16/2021 Status: (Other)         Destinee Green  [address]  [address] [place] [id number]      April 16, 2021      Dear MsCydney Green,    RE: Remote Results    We are writing to you regarding your recent Remote Pacemaker check from home. Your transmission was received successfully. Battery status is satisfactory at this time.     Your results are showing no significant changes.    Your next device appointment will be a clinic visit.  Please call in April to schedule.      To schedule or reschedule, please call [phone] and press 1.    NOTE: If you would like to do an extra transmission, please call [phone] and press 3 to speak to a nurse BEFORE transmitting. This ensures that the Device Clinic staff is aware of the reason you are sending a transmission, and can follow-up with you after it has been reviewed.    We will be checking your implanted device from home (remotely) every three months unless otherwise instructed. We will need to see you in the clinic at least once a year. You may need to be seen in the clinic sooner depending on the results of your check.    Please be aware:    The follow-up schedule is like a Physician prescription.    Your remote monitor is paired to your specific implanted device.      Sincerely,    [facility] Device Clinic

## 2021-06-21 NOTE — LETTER
Letter by Meagan Miller at      Author: Meagan Miller Service: -- Author Type: --    Filed:  Encounter Date: 10/14/2020 Status: (Other)         Destinee Green  [address]  [address] [place] [address]      October 15, 2020      Dear Ms. Green,    RE: Remote Results    We are writing to you regarding your recent Remote Pacemaker check from home. Your transmission was received successfully. Battery status is satisfactory at this time.     Your results are showing no significant changes.    Your next device appointment will be a remote check on January 14, 2021.  You can choose the time of day you wish to transmit.    To schedule or reschedule, please call [phone] and press 1.    NOTE: If you would like to do an extra transmission, please call [phone] and press 3 to speak to a nurse BEFORE transmitting. This ensures that the Device Clinic staff is aware of the reason you are sending a transmission, and can follow-up with you after it has been reviewed.    We will be checking your implanted device from home (remotely) every three months unless otherwise instructed. We will need to see you in the clinic at least once a year. You may need to be seen in the clinic sooner depending on the results of your check.    Please be aware:    The follow-up schedule is like a Physician prescription.    Your remote monitor is paired to your specific implanted device.      Sincerely,    [facility] Device [facility]

## 2021-08-20 ENCOUNTER — ANCILLARY PROCEDURE (OUTPATIENT)
Dept: CARDIOLOGY | Facility: CLINIC | Age: 86
End: 2021-08-20
Attending: INTERNAL MEDICINE
Payer: MEDICARE

## 2021-08-20 DIAGNOSIS — Z95.0 CARDIAC PACEMAKER IN SITU: ICD-10-CM

## 2021-08-20 PROCEDURE — 93294 REM INTERROG EVL PM/LDLS PM: CPT | Performed by: INTERNAL MEDICINE

## 2021-08-20 PROCEDURE — 93296 REM INTERROG EVL PM/IDS: CPT | Performed by: INTERNAL MEDICINE

## 2021-08-21 LAB
MDC_IDC_LEAD_IMPLANT_DT: NORMAL
MDC_IDC_LEAD_IMPLANT_DT: NORMAL
MDC_IDC_LEAD_LOCATION: NORMAL
MDC_IDC_LEAD_LOCATION: NORMAL
MDC_IDC_LEAD_LOCATION_DETAIL_1: NORMAL
MDC_IDC_LEAD_LOCATION_DETAIL_1: NORMAL
MDC_IDC_LEAD_MFG: NORMAL
MDC_IDC_LEAD_MFG: NORMAL
MDC_IDC_LEAD_MODEL: NORMAL
MDC_IDC_LEAD_MODEL: NORMAL
MDC_IDC_LEAD_POLARITY_TYPE: NORMAL
MDC_IDC_LEAD_POLARITY_TYPE: NORMAL
MDC_IDC_LEAD_SERIAL: NORMAL
MDC_IDC_LEAD_SERIAL: NORMAL
MDC_IDC_MSMT_BATTERY_DTM: NORMAL
MDC_IDC_MSMT_BATTERY_IMPEDANCE: 1396 OHM
MDC_IDC_MSMT_BATTERY_REMAINING_LONGEVITY: 49 MO
MDC_IDC_MSMT_BATTERY_STATUS: NORMAL
MDC_IDC_MSMT_BATTERY_VOLTAGE: 2.76 V
MDC_IDC_MSMT_LEADCHNL_RA_IMPEDANCE_VALUE: 403 OHM
MDC_IDC_MSMT_LEADCHNL_RA_PACING_THRESHOLD_AMPLITUDE: 0.88 V
MDC_IDC_MSMT_LEADCHNL_RA_PACING_THRESHOLD_PULSEWIDTH: 0.4 MS
MDC_IDC_MSMT_LEADCHNL_RV_IMPEDANCE_VALUE: 457 OHM
MDC_IDC_MSMT_LEADCHNL_RV_PACING_THRESHOLD_AMPLITUDE: 0.75 V
MDC_IDC_MSMT_LEADCHNL_RV_PACING_THRESHOLD_PULSEWIDTH: 0.4 MS
MDC_IDC_PG_IMPLANT_DTM: NORMAL
MDC_IDC_PG_MFG: NORMAL
MDC_IDC_PG_MODEL: NORMAL
MDC_IDC_PG_SERIAL: NORMAL
MDC_IDC_PG_TYPE: NORMAL
MDC_IDC_SESS_CLINIC_NAME: NORMAL
MDC_IDC_SESS_DTM: NORMAL
MDC_IDC_SESS_TYPE: NORMAL
MDC_IDC_SET_BRADY_AT_MODE_SWITCH_MODE: NORMAL
MDC_IDC_SET_BRADY_AT_MODE_SWITCH_RATE: 175 {BEATS}/MIN
MDC_IDC_SET_BRADY_LOWRATE: 60 {BEATS}/MIN
MDC_IDC_SET_BRADY_MAX_SENSOR_RATE: 120 {BEATS}/MIN
MDC_IDC_SET_BRADY_MAX_TRACKING_RATE: 120 {BEATS}/MIN
MDC_IDC_SET_BRADY_MODE: NORMAL
MDC_IDC_SET_BRADY_PAV_DELAY_LOW: 180 MS
MDC_IDC_SET_BRADY_SAV_DELAY_LOW: 150 MS
MDC_IDC_SET_LEADCHNL_RA_PACING_AMPLITUDE: 1.25 V
MDC_IDC_SET_LEADCHNL_RA_PACING_CAPTURE_MODE: NORMAL
MDC_IDC_SET_LEADCHNL_RA_PACING_POLARITY: NORMAL
MDC_IDC_SET_LEADCHNL_RA_PACING_PULSEWIDTH: 0.4 MS
MDC_IDC_SET_LEADCHNL_RA_SENSING_POLARITY: NORMAL
MDC_IDC_SET_LEADCHNL_RA_SENSING_SENSITIVITY: 0.5 MV
MDC_IDC_SET_LEADCHNL_RV_PACING_AMPLITUDE: 2 V
MDC_IDC_SET_LEADCHNL_RV_PACING_CAPTURE_MODE: NORMAL
MDC_IDC_SET_LEADCHNL_RV_PACING_POLARITY: NORMAL
MDC_IDC_SET_LEADCHNL_RV_PACING_PULSEWIDTH: 0.4 MS
MDC_IDC_SET_LEADCHNL_RV_SENSING_POLARITY: NORMAL
MDC_IDC_SET_LEADCHNL_RV_SENSING_SENSITIVITY: 2.8 MV
MDC_IDC_SET_ZONE_DETECTION_INTERVAL: 333.33 MS
MDC_IDC_SET_ZONE_DETECTION_INTERVAL: 342.86 MS
MDC_IDC_SET_ZONE_TYPE: NORMAL
MDC_IDC_SET_ZONE_TYPE: NORMAL
MDC_IDC_STAT_AT_BURDEN_PERCENT: 0 %
MDC_IDC_STAT_AT_DTM_END: NORMAL
MDC_IDC_STAT_AT_DTM_START: NORMAL
MDC_IDC_STAT_AT_MODE_SW_COUNT: 0
MDC_IDC_STAT_BRADY_AP_VP_PERCENT: 63 %
MDC_IDC_STAT_BRADY_AP_VS_PERCENT: 1 %
MDC_IDC_STAT_BRADY_AS_VP_PERCENT: 34 %
MDC_IDC_STAT_BRADY_AS_VS_PERCENT: 1 %
MDC_IDC_STAT_BRADY_DTM_END: NORMAL
MDC_IDC_STAT_BRADY_DTM_START: NORMAL
MDC_IDC_STAT_EPISODE_RECENT_COUNT: 0
MDC_IDC_STAT_EPISODE_RECENT_COUNT: 2
MDC_IDC_STAT_EPISODE_RECENT_COUNT_DTM_END: NORMAL
MDC_IDC_STAT_EPISODE_RECENT_COUNT_DTM_END: NORMAL
MDC_IDC_STAT_EPISODE_RECENT_COUNT_DTM_START: NORMAL
MDC_IDC_STAT_EPISODE_RECENT_COUNT_DTM_START: NORMAL
MDC_IDC_STAT_EPISODE_TYPE: NORMAL
MDC_IDC_STAT_EPISODE_TYPE: NORMAL

## 2021-11-30 ENCOUNTER — ANCILLARY PROCEDURE (OUTPATIENT)
Dept: CARDIOLOGY | Facility: CLINIC | Age: 86
End: 2021-11-30
Attending: INTERNAL MEDICINE
Payer: MEDICARE

## 2021-11-30 DIAGNOSIS — Z95.0 PACEMAKER: ICD-10-CM

## 2021-11-30 DIAGNOSIS — I49.5 SICK SINUS SYNDROME (H): ICD-10-CM

## 2021-11-30 PROCEDURE — 93294 REM INTERROG EVL PM/LDLS PM: CPT | Performed by: INTERNAL MEDICINE

## 2021-11-30 PROCEDURE — 93296 REM INTERROG EVL PM/IDS: CPT | Performed by: INTERNAL MEDICINE

## 2021-12-08 LAB
MDC_IDC_LEAD_IMPLANT_DT: NORMAL
MDC_IDC_LEAD_IMPLANT_DT: NORMAL
MDC_IDC_LEAD_LOCATION: NORMAL
MDC_IDC_LEAD_LOCATION: NORMAL
MDC_IDC_LEAD_LOCATION_DETAIL_1: NORMAL
MDC_IDC_LEAD_LOCATION_DETAIL_1: NORMAL
MDC_IDC_LEAD_MFG: NORMAL
MDC_IDC_LEAD_MFG: NORMAL
MDC_IDC_LEAD_MODEL: NORMAL
MDC_IDC_LEAD_MODEL: NORMAL
MDC_IDC_LEAD_POLARITY_TYPE: NORMAL
MDC_IDC_LEAD_POLARITY_TYPE: NORMAL
MDC_IDC_LEAD_SERIAL: NORMAL
MDC_IDC_LEAD_SERIAL: NORMAL
MDC_IDC_MSMT_BATTERY_DTM: NORMAL
MDC_IDC_MSMT_BATTERY_IMPEDANCE: 1506 OHM
MDC_IDC_MSMT_BATTERY_REMAINING_LONGEVITY: 45 MO
MDC_IDC_MSMT_BATTERY_STATUS: NORMAL
MDC_IDC_MSMT_BATTERY_VOLTAGE: 2.76 V
MDC_IDC_MSMT_LEADCHNL_RA_IMPEDANCE_VALUE: 394 OHM
MDC_IDC_MSMT_LEADCHNL_RA_PACING_THRESHOLD_AMPLITUDE: 0.88 V
MDC_IDC_MSMT_LEADCHNL_RA_PACING_THRESHOLD_PULSEWIDTH: 0.4 MS
MDC_IDC_MSMT_LEADCHNL_RV_IMPEDANCE_VALUE: 424 OHM
MDC_IDC_MSMT_LEADCHNL_RV_PACING_THRESHOLD_AMPLITUDE: 0.75 V
MDC_IDC_MSMT_LEADCHNL_RV_PACING_THRESHOLD_PULSEWIDTH: 0.4 MS
MDC_IDC_PG_IMPLANT_DTM: NORMAL
MDC_IDC_PG_MFG: NORMAL
MDC_IDC_PG_MODEL: NORMAL
MDC_IDC_PG_SERIAL: NORMAL
MDC_IDC_PG_TYPE: NORMAL
MDC_IDC_SESS_CLINIC_NAME: NORMAL
MDC_IDC_SESS_DTM: NORMAL
MDC_IDC_SESS_TYPE: NORMAL
MDC_IDC_SET_BRADY_AT_MODE_SWITCH_MODE: NORMAL
MDC_IDC_SET_BRADY_AT_MODE_SWITCH_RATE: 175 {BEATS}/MIN
MDC_IDC_SET_BRADY_LOWRATE: 60 {BEATS}/MIN
MDC_IDC_SET_BRADY_MAX_SENSOR_RATE: 120 {BEATS}/MIN
MDC_IDC_SET_BRADY_MAX_TRACKING_RATE: 120 {BEATS}/MIN
MDC_IDC_SET_BRADY_MODE: NORMAL
MDC_IDC_SET_BRADY_PAV_DELAY_LOW: 180 MS
MDC_IDC_SET_BRADY_SAV_DELAY_LOW: 150 MS
MDC_IDC_SET_LEADCHNL_RA_PACING_AMPLITUDE: 1.25 V
MDC_IDC_SET_LEADCHNL_RA_PACING_CAPTURE_MODE: NORMAL
MDC_IDC_SET_LEADCHNL_RA_PACING_POLARITY: NORMAL
MDC_IDC_SET_LEADCHNL_RA_PACING_PULSEWIDTH: 0.4 MS
MDC_IDC_SET_LEADCHNL_RA_SENSING_POLARITY: NORMAL
MDC_IDC_SET_LEADCHNL_RA_SENSING_SENSITIVITY: 0.5 MV
MDC_IDC_SET_LEADCHNL_RV_PACING_AMPLITUDE: 2 V
MDC_IDC_SET_LEADCHNL_RV_PACING_CAPTURE_MODE: NORMAL
MDC_IDC_SET_LEADCHNL_RV_PACING_POLARITY: NORMAL
MDC_IDC_SET_LEADCHNL_RV_PACING_PULSEWIDTH: 0.4 MS
MDC_IDC_SET_LEADCHNL_RV_SENSING_POLARITY: NORMAL
MDC_IDC_SET_LEADCHNL_RV_SENSING_SENSITIVITY: 2.8 MV
MDC_IDC_SET_ZONE_DETECTION_INTERVAL: 333.33 MS
MDC_IDC_SET_ZONE_DETECTION_INTERVAL: 342.86 MS
MDC_IDC_SET_ZONE_TYPE: NORMAL
MDC_IDC_SET_ZONE_TYPE: NORMAL
MDC_IDC_STAT_AT_BURDEN_PERCENT: 0 %
MDC_IDC_STAT_AT_DTM_END: NORMAL
MDC_IDC_STAT_AT_DTM_START: NORMAL
MDC_IDC_STAT_AT_MODE_SW_COUNT: 2
MDC_IDC_STAT_BRADY_AP_VP_PERCENT: 66 %
MDC_IDC_STAT_BRADY_AP_VS_PERCENT: 1 %
MDC_IDC_STAT_BRADY_AS_VP_PERCENT: 32 %
MDC_IDC_STAT_BRADY_AS_VS_PERCENT: 1 %
MDC_IDC_STAT_BRADY_DTM_END: NORMAL
MDC_IDC_STAT_BRADY_DTM_START: NORMAL
MDC_IDC_STAT_EPISODE_RECENT_COUNT: 0
MDC_IDC_STAT_EPISODE_RECENT_COUNT: 3
MDC_IDC_STAT_EPISODE_RECENT_COUNT_DTM_END: NORMAL
MDC_IDC_STAT_EPISODE_RECENT_COUNT_DTM_END: NORMAL
MDC_IDC_STAT_EPISODE_RECENT_COUNT_DTM_START: NORMAL
MDC_IDC_STAT_EPISODE_RECENT_COUNT_DTM_START: NORMAL
MDC_IDC_STAT_EPISODE_TYPE: NORMAL
MDC_IDC_STAT_EPISODE_TYPE: NORMAL

## 2022-01-01 ENCOUNTER — ANCILLARY PROCEDURE (OUTPATIENT)
Dept: CARDIOLOGY | Facility: CLINIC | Age: 87
End: 2022-01-01
Attending: INTERNAL MEDICINE
Payer: MEDICARE

## 2022-01-01 ENCOUNTER — OFFICE VISIT (OUTPATIENT)
Dept: CARDIOLOGY | Facility: CLINIC | Age: 87
End: 2022-01-01
Payer: MEDICARE

## 2022-01-01 ENCOUNTER — TELEPHONE (OUTPATIENT)
Dept: CARDIOLOGY | Facility: CLINIC | Age: 87
End: 2022-01-01

## 2022-01-01 VITALS
RESPIRATION RATE: 16 BRPM | WEIGHT: 140 LBS | HEIGHT: 60 IN | DIASTOLIC BLOOD PRESSURE: 62 MMHG | HEART RATE: 71 BPM | SYSTOLIC BLOOD PRESSURE: 110 MMHG | BODY MASS INDEX: 27.48 KG/M2

## 2022-01-01 DIAGNOSIS — I49.5 SINOATRIAL NODE DYSFUNCTION (H): Primary | ICD-10-CM

## 2022-01-01 DIAGNOSIS — Z95.0 PACEMAKER: ICD-10-CM

## 2022-01-01 DIAGNOSIS — Z95.0 CARDIAC PACEMAKER IN SITU: ICD-10-CM

## 2022-01-01 DIAGNOSIS — I49.5 SSS (SICK SINUS SYNDROME) (H): Primary | ICD-10-CM

## 2022-01-01 DIAGNOSIS — I49.5 SICK SINUS SYNDROME (H): ICD-10-CM

## 2022-01-01 DIAGNOSIS — I10 ESSENTIAL HYPERTENSION: ICD-10-CM

## 2022-01-01 DIAGNOSIS — I49.5 SICK SINUS SYNDROME (H): Primary | ICD-10-CM

## 2022-01-01 DIAGNOSIS — I49.5 SSS (SICK SINUS SYNDROME) (H): ICD-10-CM

## 2022-01-01 LAB
MDC_IDC_LEAD_IMPLANT_DT: NORMAL
MDC_IDC_LEAD_LOCATION: NORMAL
MDC_IDC_LEAD_LOCATION_DETAIL_1: NORMAL
MDC_IDC_LEAD_MFG: NORMAL
MDC_IDC_LEAD_MODEL: NORMAL
MDC_IDC_LEAD_POLARITY_TYPE: NORMAL
MDC_IDC_LEAD_SERIAL: NORMAL
MDC_IDC_MSMT_BATTERY_DTM: NORMAL
MDC_IDC_MSMT_BATTERY_IMPEDANCE: 1591 OHM
MDC_IDC_MSMT_BATTERY_IMPEDANCE: 1645 OHM
MDC_IDC_MSMT_BATTERY_IMPEDANCE: 1777 OHM
MDC_IDC_MSMT_BATTERY_REMAINING_LONGEVITY: 42 MO
MDC_IDC_MSMT_BATTERY_REMAINING_LONGEVITY: 43 MO
MDC_IDC_MSMT_BATTERY_REMAINING_LONGEVITY: 43 MO
MDC_IDC_MSMT_BATTERY_STATUS: NORMAL
MDC_IDC_MSMT_BATTERY_VOLTAGE: 2.75 V
MDC_IDC_MSMT_LEADCHNL_RA_IMPEDANCE_VALUE: 389 OHM
MDC_IDC_MSMT_LEADCHNL_RA_IMPEDANCE_VALUE: 396 OHM
MDC_IDC_MSMT_LEADCHNL_RA_IMPEDANCE_VALUE: 415 OHM
MDC_IDC_MSMT_LEADCHNL_RA_PACING_THRESHOLD_AMPLITUDE: 0.62 V
MDC_IDC_MSMT_LEADCHNL_RA_PACING_THRESHOLD_AMPLITUDE: 0.75 V
MDC_IDC_MSMT_LEADCHNL_RA_PACING_THRESHOLD_AMPLITUDE: 0.88 V
MDC_IDC_MSMT_LEADCHNL_RA_PACING_THRESHOLD_AMPLITUDE: 0.88 V
MDC_IDC_MSMT_LEADCHNL_RA_PACING_THRESHOLD_PULSEWIDTH: 0.4 MS
MDC_IDC_MSMT_LEADCHNL_RA_SENSING_INTR_AMPL: 2.8 MV
MDC_IDC_MSMT_LEADCHNL_RV_IMPEDANCE_VALUE: 405 OHM
MDC_IDC_MSMT_LEADCHNL_RV_IMPEDANCE_VALUE: 412 OHM
MDC_IDC_MSMT_LEADCHNL_RV_IMPEDANCE_VALUE: 452 OHM
MDC_IDC_MSMT_LEADCHNL_RV_PACING_THRESHOLD_AMPLITUDE: 0.62 V
MDC_IDC_MSMT_LEADCHNL_RV_PACING_THRESHOLD_AMPLITUDE: 0.62 V
MDC_IDC_MSMT_LEADCHNL_RV_PACING_THRESHOLD_AMPLITUDE: 0.75 V
MDC_IDC_MSMT_LEADCHNL_RV_PACING_THRESHOLD_AMPLITUDE: 0.88 V
MDC_IDC_MSMT_LEADCHNL_RV_PACING_THRESHOLD_PULSEWIDTH: 0.4 MS
MDC_IDC_MSMT_LEADCHNL_RV_SENSING_INTR_AMPL: 11.2 MV
MDC_IDC_PG_IMPLANT_DTM: NORMAL
MDC_IDC_PG_MFG: NORMAL
MDC_IDC_PG_MODEL: NORMAL
MDC_IDC_PG_SERIAL: NORMAL
MDC_IDC_PG_TYPE: NORMAL
MDC_IDC_SESS_CLINIC_NAME: NORMAL
MDC_IDC_SESS_DTM: NORMAL
MDC_IDC_SESS_TYPE: NORMAL
MDC_IDC_SET_BRADY_AT_MODE_SWITCH_MODE: NORMAL
MDC_IDC_SET_BRADY_AT_MODE_SWITCH_RATE: 175 {BEATS}/MIN
MDC_IDC_SET_BRADY_LOWRATE: 60 {BEATS}/MIN
MDC_IDC_SET_BRADY_MAX_SENSOR_RATE: 120 {BEATS}/MIN
MDC_IDC_SET_BRADY_MAX_TRACKING_RATE: 120 {BEATS}/MIN
MDC_IDC_SET_BRADY_MODE: NORMAL
MDC_IDC_SET_BRADY_PAV_DELAY_LOW: 180 MS
MDC_IDC_SET_BRADY_SAV_DELAY_LOW: 150 MS
MDC_IDC_SET_LEADCHNL_RA_PACING_AMPLITUDE: 1 V
MDC_IDC_SET_LEADCHNL_RA_PACING_AMPLITUDE: 1.25 V
MDC_IDC_SET_LEADCHNL_RA_PACING_AMPLITUDE: NORMAL
MDC_IDC_SET_LEADCHNL_RA_PACING_CAPTURE_MODE: NORMAL
MDC_IDC_SET_LEADCHNL_RA_PACING_POLARITY: NORMAL
MDC_IDC_SET_LEADCHNL_RA_PACING_PULSEWIDTH: 0.4 MS
MDC_IDC_SET_LEADCHNL_RA_SENSING_POLARITY: NORMAL
MDC_IDC_SET_LEADCHNL_RA_SENSING_SENSITIVITY: 0.5 MV
MDC_IDC_SET_LEADCHNL_RV_PACING_AMPLITUDE: 2 V
MDC_IDC_SET_LEADCHNL_RV_PACING_AMPLITUDE: 2 V
MDC_IDC_SET_LEADCHNL_RV_PACING_AMPLITUDE: NORMAL
MDC_IDC_SET_LEADCHNL_RV_PACING_CAPTURE_MODE: NORMAL
MDC_IDC_SET_LEADCHNL_RV_PACING_POLARITY: NORMAL
MDC_IDC_SET_LEADCHNL_RV_PACING_PULSEWIDTH: 0.4 MS
MDC_IDC_SET_LEADCHNL_RV_SENSING_POLARITY: NORMAL
MDC_IDC_SET_LEADCHNL_RV_SENSING_SENSITIVITY: 2.8 MV
MDC_IDC_SET_ZONE_DETECTION_INTERVAL: 333.33 MS
MDC_IDC_SET_ZONE_DETECTION_INTERVAL: 342.86 MS
MDC_IDC_SET_ZONE_TYPE: NORMAL
MDC_IDC_STAT_AT_BURDEN_PERCENT: 0 %
MDC_IDC_STAT_AT_DTM_END: NORMAL
MDC_IDC_STAT_AT_DTM_START: NORMAL
MDC_IDC_STAT_AT_MODE_SW_COUNT: 1
MDC_IDC_STAT_AT_MODE_SW_COUNT: 5
MDC_IDC_STAT_AT_MODE_SW_COUNT: 8
MDC_IDC_STAT_BRADY_AP_VP_PERCENT: 62 %
MDC_IDC_STAT_BRADY_AP_VP_PERCENT: 64 %
MDC_IDC_STAT_BRADY_AP_VP_PERCENT: 64 %
MDC_IDC_STAT_BRADY_AP_VS_PERCENT: 1 %
MDC_IDC_STAT_BRADY_AS_VP_PERCENT: 34 %
MDC_IDC_STAT_BRADY_AS_VP_PERCENT: 34 %
MDC_IDC_STAT_BRADY_AS_VP_PERCENT: 35 %
MDC_IDC_STAT_BRADY_AS_VS_PERCENT: 1 %
MDC_IDC_STAT_BRADY_AS_VS_PERCENT: 1 %
MDC_IDC_STAT_BRADY_AS_VS_PERCENT: 2 %
MDC_IDC_STAT_BRADY_DTM_END: NORMAL
MDC_IDC_STAT_BRADY_DTM_START: NORMAL
MDC_IDC_STAT_BRADY_RA_PERCENT_PACED: 63 %
MDC_IDC_STAT_BRADY_RA_PERCENT_PACED: 64.7 %
MDC_IDC_STAT_BRADY_RA_PERCENT_PACED: 65 %
MDC_IDC_STAT_BRADY_RV_PERCENT_PACED: 97 %
MDC_IDC_STAT_BRADY_RV_PERCENT_PACED: 97.9 %
MDC_IDC_STAT_BRADY_RV_PERCENT_PACED: 98 %
MDC_IDC_STAT_EPISODE_RECENT_COUNT: 0
MDC_IDC_STAT_EPISODE_RECENT_COUNT: 5
MDC_IDC_STAT_EPISODE_RECENT_COUNT: 5
MDC_IDC_STAT_EPISODE_RECENT_COUNT_DTM_END: NORMAL
MDC_IDC_STAT_EPISODE_RECENT_COUNT_DTM_START: NORMAL
MDC_IDC_STAT_EPISODE_TYPE: NORMAL

## 2022-01-01 PROCEDURE — 93294 REM INTERROG EVL PM/LDLS PM: CPT | Performed by: INTERNAL MEDICINE

## 2022-01-01 PROCEDURE — 99204 OFFICE O/P NEW MOD 45 MIN: CPT | Performed by: INTERNAL MEDICINE

## 2022-01-01 PROCEDURE — 93296 REM INTERROG EVL PM/IDS: CPT | Performed by: INTERNAL MEDICINE

## 2022-01-01 PROCEDURE — 93280 PM DEVICE PROGR EVAL DUAL: CPT | Performed by: INTERNAL MEDICINE

## 2022-01-01 RX ORDER — SENNOSIDES 8.6 MG
CAPSULE ORAL
COMMUNITY
Start: 2021-10-06 | End: 2022-01-01

## 2022-09-14 NOTE — PROGRESS NOTES
HEART CARE ENCOUNTER CONSULTATON NOTE      [facility]  [phone]      Assessment/Recommendations   Assessment/Plan:  1.  Sick sinus syndrome with normal pacemaker function.  Pacemaker most recently interrogated in August.  No significant dysrhythmias identified on the most recent pacemaker interrogation.  When I saw her in 2019 most recent she had 4 hours of atrial fibrillation.  She has been experiencing some rare nonsustained ventricular tachycardia.  Her preference at that time was not to pursue additional evaluation and we elected to monitor for any worsening atrial fibrillation which has not occurred.  Due to the frequency of ventricular pacing we talked about follow-up echocardiography which she would like to do but hopefully she can do in [place].  We will see what we can arrange.  She continues on low-dose furosemide reportedly 10 mg daily.    2.  Hypertension.  Blood pressure appears to be under good control with the current combination of medications.  No medication changes made today.  Laboratory studies are reviewed from February 2022 at which time basic metabolic profile within normal limits with a glucose mildly elevated at 101 and a normal CBC.    Echocardiogram  Follow-up 1 year or sooner if specific issues were to arise.  Patient to follow-up periodically with her primary care provider as a relates to laboratory follow-up  Pacemaker interrogation every 3 months with the next one scheduled for December.         History of Present Illness/Subjective    HPI: Destinee Green is a 89 year old female who is seen in follow up.  She has a history of sick sinus syndrome.  Her past medical history lists coronary artery disease but she's had no overt coronary disease event.  She had a nuclear stress test in 2007 that was reported negative for ischemia.  She reports that she has been feeling well.  She has no specific cardiovascular symptoms.  Does indicate that she is sedentary  and lives in a long-term home.  She is accompanied by her son.  Most recent pacemaker interrogation is from August demonstrated normal pacemaker function, ventricular paced 97% of the time.  Her last echocardiogram dates to 2018 reviewed talk about follow-up echocardiography.    Recent Echocardiogram Results:  Echocardiogram Complete  Order: [id number]  Status: Final result    Visible to patient: No (inaccessible in MyChart)    Next appt: 09/14/2022 at 02:50 PM in Cardiology (Kumar Timmons MD)    Dx: Paroxysmal atrial fibrillation (H)    1 Result Note    Details    Reading Physician Reading Date Result Priority   Kendall Ho MD  [phone] 10/5/2018 Routine   Provider, Historical 10/5/2018      Narrative & Impression    When compared to the previous study dated 9/26/2016, no significant change.    Left ventricle ejection fraction is normal. The calculated left ventricular ejection fraction is 57%.    Normal left ventricular size and systolic function.    Normal right ventricular size and systolic function.    Mild mitral regurgitation    Pacemaker lead in right heart chambers           8/26/22  5:41 PM 8/29/22 12:01 PM [id number] [facility]          Narrative & Impression    Type: routine remote pacemaker transmission.  Device: Medtronic Adapta  Pacing%/Programmed: AP 63%,  97%, in DDDR  ppm mode.  Lead(s): stable  Battery longevity: estimated 3.5 yrs  Presenting: AP- rate 82 bpm.  Atrial arrhythmias: since 5/16/22, one mode switch, duration <1 minute, no EGM available.  Ventricular arrhythmias: since 5/16/22, none detected.  Device/Lead alerts: none  Comments: normal magnet and pacemaker function.  Plan: next remote scheduled December 6, 2022.  SHERI Richey, Device Specialist     I have reviewed and interpreted the device interrogation, settings, programming, and encounter summary. The device is functioning within normal device parameters. I agree with the current  findings, assessment and plan          Physical Examination  Review of Systems   Vitals: 110/62, weight 140 pounds, heart rate of 71 and regular  Wt Readings from Last 3 Encounters:   05/14/21 60.8 kg (134 lb)   10/24/19 59 kg (130 lb)   09/09/19 58.5 kg (129 lb)       General Appearance:   no distress, normal body habitus   ENT/Mouth: membranes moist, no oral lesions or bleeding gums.      EYES:  no scleral icterus, normal conjunctivae   Neck: no carotid bruits or thyromegaly   Chest/Lungs:   lungs are clear to auscultation, no rales or wheezing, pacemaker scar well-healed, equal chest wall expansion    Cardiovascular:   Regular. Normal first and second heart sounds with soft systolic murmur, rubs, or gallops; the carotid, radial and posterior tibial pulses are intact, Jugular venous pressure within normal limits, mild edema bilaterally    Abdomen:  no organomegaly, masses, bruits, or tenderness; bowel sounds are present   Extremities: no cyanosis or clubbing   Skin: no xanthelasma, warm.    Neurologic: , no tremors     Psychiatric: alert and oriented x3, calm        Please refer above for cardiac ROS details.        Medical History  Surgical History Family History Social History   No past medical history on file.  Past Surgical History:   Procedure Laterality Date     APPENDECTOMY       HYSTERECTOMY       OTHER SURGICAL HISTORY      cyst removal in right breast     CA ESOPHAGOGASTRODUODENOSCOPY TRANSORAL DIAGNOSTIC N/A 10/24/2019    Procedure: ESOPHAGOGASTRODUODENOSCOPY (EGD) with foreign body removal;  Surgeon: Kyle Perdomo MD;  Location: [facility];  Service: Gastroenterology     TONSILLECTOMY       No family history on file.     Social History     Socioeconomic History     Marital status:      Spouse name: Not on file     Number of children: Not on file     Years of education: Not on file     Highest education level: Not on file   Occupational History     Not on file   Tobacco Use     Smoking  status: Former Smoker     Packs/day: 0.00     Smokeless tobacco: Never Used   Substance and Sexual Activity     Alcohol use: Not Currently     Drug use: Not Currently     Sexual activity: Not on file   Other Topics Concern     Not on file   Social History Narrative    ** Merged History Encounter **       Social Determinants of Health     Financial Resource Strain: Not on file   Food Insecurity: Not on file   Transportation Needs: Not on file   Physical Activity: Not on file   Stress: Not on file   Social Connections: Not on file   Intimate Partner Violence: Not on file   Housing Stability: Not on file           Medications  Allergies   Current Outpatient Medications   Medication Sig Dispense Refill     ascorbic acid, vitamin C, (ASCORBIC ACID WITH TEO HIPS) 500 MG tablet [ASCORBIC ACID, VITAMIN C, (ASCORBIC ACID WITH TEO HIPS) 500 MG TABLET] Take 250 mg by mouth 3 (three) times a week.        calcium-vitamin D 500 mg(1,250mg) -200 unit per tablet [CALCIUM-VITAMIN D 500 MG(1,250MG) -200 UNIT PER TABLET] Take 1 tablet by mouth 2 (two) times a day with meals.       ferrous sulfate 325 (65 FE) MG tablet [FERROUS SULFATE 325 (65 FE) MG TABLET] Take 325 mg by mouth 3 (three) times a week.        furosemide (LASIX) 20 MG tablet [FUROSEMIDE (LASIX) 20 MG TABLET] Take 10 mg by mouth daily.        metoprolol succinate (TOPROL-XL) 100 MG 24 hr tablet [METOPROLOL SUCCINATE (TOPROL-XL) 100 MG 24 HR TABLET] Take 100 mg by mouth daily.       mirtazapine (REMERON) 7.5 MG tablet [MIRTAZAPINE (REMERON) 7.5 MG TABLET] Take 1 tablet by mouth daily.       pantoprazole (PROTONIX) 20 MG tablet [PANTOPRAZOLE (PROTONIX) 20 MG TABLET] Take 1 tablet by mouth daily.       VIT C/E/ZN/COPPR/LUTEIN/ZEAXAN (PRESERVISION AREDS 2 ORAL) [VIT C/E/ZN/COPPR/LUTEIN/ZEAXAN (PRESERVISION AREDS 2 ORAL)] Take by mouth 2 (two) times a day.       No Known Allergies       Lab Results    Chemistry/lipid CBC Cardiac Enzymes/BNP/TSH/INR   No results for  input(s): CHOL, HDL, LDL, TRIG, CHOLHDLRATIO in the last 09359 hours.  No results for input(s): LDL in the last 71560 hours.  No results for input(s): NA, POTASSIUM, CHLORIDE, CO2, GLC, BUN, CR, GFRESTIMATED, AURY in the last 99346 hours.    Invalid input(s): GRFESTBLACK  No results for input(s): CR in the last 74211 hours.  No results for input(s): A1C in the last 61130 hours.       No results for input(s): WBC, HGB, HCT, MCV, PLT in the last 62469 hours.  No results for input(s): HGB in the last 62310 hours. No results for input(s): TROPONINI in the last 14855 hours.  No results for input(s): BNP, NTBNPI, NTBNP in the last 63674 hours.  No results for input(s): TSH in the last 34674 hours.  No results for input(s): INR in the last 81528 hours.     Kumar Timmons MD

## 2022-09-14 NOTE — PATIENT INSTRUCTIONS
We are going to plan a heart ultrasound and will see if we can schedule it in [place].If you dont hear in a week katy call.My nurse is Shannan and her number is [phone].The next remote pacemaker check is December 6.

## 2022-09-14 NOTE — LETTER
9/14/2022    Renard Tran MD  [address] [address]    RE: Destinee Green       Dear Colleague,     I had the pleasure of seeing Destinee Green in the [facility].    HEART CARE ENCOUNTER CONSULTATON NOTE      [facility]  [phone]      Assessment/Recommendations   Assessment/Plan:  1.  Sick sinus syndrome with normal pacemaker function.  Pacemaker most recently interrogated in August.  No significant dysrhythmias identified on the most recent pacemaker interrogation.  When I saw her in 2019 most recent she had 4 hours of atrial fibrillation.  She has been experiencing some rare nonsustained ventricular tachycardia.  Her preference at that time was not to pursue additional evaluation and we elected to monitor for any worsening atrial fibrillation which has not occurred.  Due to the frequency of ventricular pacing we talked about follow-up echocardiography which she would like to do but hopefully she can do in [place].  We will see what we can arrange.  She continues on low-dose furosemide reportedly 10 mg daily.    2.  Hypertension.  Blood pressure appears to be under good control with the current combination of medications.  No medication changes made today.  Laboratory studies are reviewed from February 2022 at which time basic metabolic profile within normal limits with a glucose mildly elevated at 101 and a normal CBC.    Echocardiogram  Follow-up 1 year or sooner if specific issues were to arise.  Patient to follow-up periodically with her primary care provider as a relates to laboratory follow-up  Pacemaker interrogation every 3 months with the next one scheduled for December.         History of Present Illness/Subjective    HPI: Destinee Green is a 89 year old female who is seen in follow up.  She has a history of sick sinus syndrome.  Her past medical history lists coronary artery disease but she's had no overt  coronary disease event.  She had a nuclear stress test in 2007 that was reported negative for ischemia.  She reports that she has been feeling well.  She has no specific cardiovascular symptoms.  Does indicate that she is sedentary and lives in a shelter home.  She is accompanied by her son.  Most recent pacemaker interrogation is from August demonstrated normal pacemaker function, ventricular paced 97% of the time.  Her last echocardiogram dates to 2018 reviewed talk about follow-up echocardiography.    Recent Echocardiogram Results:  Echocardiogram Complete  Order: [id number]   Status: Final result     Visible to patient: No (inaccessible in MyChart)     Next appt: 09/14/2022 at 02:50 PM in Cardiology (Kumar Timmons MD)     Dx: Paroxysmal atrial fibrillation (H)     1 Result Note    Details    Reading Physician Reading Date Result Priority   Kendall Ho MD  [phone] 10/5/2018 Routine   Provider, Historical 10/5/2018      Narrative & Impression    When compared to the previous study dated 9/26/2016, no significant change.    Left ventricle ejection fraction is normal. The calculated left ventricular ejection fraction is 57%.    Normal left ventricular size and systolic function.    Normal right ventricular size and systolic function.    Mild mitral regurgitation    Pacemaker lead in right heart chambers           8/26/22  5:41 PM 8/29/22 12:01 PM [id number] [facility]          Narrative & Impression    Type: routine remote pacemaker transmission.  Device: [employer]tronic Adapta  Pacing%/Programmed: AP 63%,  97%, in DDDR  ppm mode.  Lead(s): stable  Battery longevity: estimated 3.5 yrs  Presenting: AP- rate 82 bpm.  Atrial arrhythmias: since 5/16/22, one mode switch, duration <1 minute, no EGM available.  Ventricular arrhythmias: since 5/16/22, none detected.  Device/Lead alerts: none  Comments: normal magnet and pacemaker function.  Plan: next remote scheduled December  6, 2022.  SHERI Richey, Device Specialist     I have reviewed and interpreted the device interrogation, settings, programming, and encounter summary. The device is functioning within normal device parameters. I agree with the current findings, assessment and plan          Physical Examination  Review of Systems   Vitals: 110/62, weight 140 pounds, heart rate of 71 and regular  Wt Readings from Last 3 Encounters:   05/14/21 60.8 kg (134 lb)   10/24/19 59 kg (130 lb)   09/09/19 58.5 kg (129 lb)       General Appearance:   no distress, normal body habitus   ENT/Mouth: membranes moist, no oral lesions or bleeding gums.      EYES:  no scleral icterus, normal conjunctivae   Neck: no carotid bruits or thyromegaly   Chest/Lungs:   lungs are clear to auscultation, no rales or wheezing, pacemaker scar well-healed, equal chest wall expansion    Cardiovascular:   Regular. Normal first and second heart sounds with soft systolic murmur, rubs, or gallops; the carotid, radial and posterior tibial pulses are intact, Jugular venous pressure within normal limits, mild edema bilaterally    Abdomen:  no organomegaly, masses, bruits, or tenderness; bowel sounds are present   Extremities: no cyanosis or clubbing   Skin: no xanthelasma, warm.    Neurologic: , no tremors     Psychiatric: alert and oriented x3, calm        Please refer above for cardiac ROS details.        Medical History  Surgical History Family History Social History   No past medical history on file.  Past Surgical History:   Procedure Laterality Date     APPENDECTOMY       HYSTERECTOMY       OTHER SURGICAL HISTORY      cyst removal in right breast     MO ESOPHAGOGASTRODUODENOSCOPY TRANSORAL DIAGNOSTIC N/A 10/24/2019    Procedure: ESOPHAGOGASTRODUODENOSCOPY (EGD) with foreign body removal;  Surgeon: Kyle Perdomo MD;  Location: [facility];  Service: Gastroenterology     TONSILLECTOMY       No family history on file.     Social History     Socioeconomic History      Marital status:      Spouse name: Not on file     Number of children: Not on file     Years of education: Not on file     Highest education level: Not on file   Occupational History     Not on file   Tobacco Use     Smoking status: Former Smoker     Packs/day: 0.00     Smokeless tobacco: Never Used   Substance and Sexual Activity     Alcohol use: Not Currently     Drug use: Not Currently     Sexual activity: Not on file   Other Topics Concern     Not on file   Social History Narrative    ** Merged History Encounter **       Social Determinants of Health     Financial Resource Strain: Not on file   Food Insecurity: Not on file   Transportation Needs: Not on file   Physical Activity: Not on file   Stress: Not on file   Social Connections: Not on file   Intimate Partner Violence: Not on file   Housing Stability: Not on file           Medications  Allergies   Current Outpatient Medications   Medication Sig Dispense Refill     ascorbic acid, vitamin C, (ASCORBIC ACID WITH TEO HIPS) 500 MG tablet [ASCORBIC ACID, VITAMIN C, (ASCORBIC ACID WITH TEO HIPS) 500 MG TABLET] Take 250 mg by mouth 3 (three) times a week.        calcium-vitamin D 500 mg(1,250mg) -200 unit per tablet [CALCIUM-VITAMIN D 500 MG(1,250MG) -200 UNIT PER TABLET] Take 1 tablet by mouth 2 (two) times a day with meals.       ferrous sulfate 325 (65 FE) MG tablet [FERROUS SULFATE 325 (65 FE) MG TABLET] Take 325 mg by mouth 3 (three) times a week.        furosemide (LASIX) 20 MG tablet [FUROSEMIDE (LASIX) 20 MG TABLET] Take 10 mg by mouth daily.        metoprolol succinate (TOPROL-XL) 100 MG 24 hr tablet [METOPROLOL SUCCINATE (TOPROL-XL) 100 MG 24 HR TABLET] Take 100 mg by mouth daily.       mirtazapine (REMERON) 7.5 MG tablet [MIRTAZAPINE (REMERON) 7.5 MG TABLET] Take 1 tablet by mouth daily.       pantoprazole (PROTONIX) 20 MG tablet [PANTOPRAZOLE (PROTONIX) 20 MG TABLET] Take 1 tablet by mouth daily.       VIT C/E/ZN/COPPR/LUTEIN/ZEAXAN  (PRESERVISION AREDS 2 ORAL) [VIT C/E/ZN/COPPR/LUTEIN/ZEAXAN (PRESERVISION AREDS 2 ORAL)] Take by mouth 2 (two) times a day.       No Known Allergies       Lab Results    Chemistry/lipid CBC Cardiac Enzymes/BNP/TSH/INR   No results for input(s): CHOL, HDL, LDL, TRIG, CHOLHDLRATIO in the last 14691 hours.  No results for input(s): LDL in the last 92023 hours.  No results for input(s): NA, POTASSIUM, CHLORIDE, CO2, GLC, BUN, CR, GFRESTIMATED, AURY in the last 11956 hours.    Invalid input(s): GRFESTBLACK  No results for input(s): CR in the last 59462 hours.  No results for input(s): A1C in the last 24239 hours.       No results for input(s): WBC, HGB, HCT, MCV, PLT in the last 18680 hours.  No results for input(s): HGB in the last 13422 hours. No results for input(s): TROPONINI in the last 66521 hours.  No results for input(s): BNP, NTBNPI, NTBNP in the last 53459 hours.  No results for input(s): TSH in the last 69011 hours.  No results for input(s): INR in the last 13078 hours.     Kumar Timmons MD    Thank you for allowing me to participate in the care of your patient.      Sincerely,     Kumar Timmons MD     [facility]  cc:   No referring provider defined for this encounter.

## 2022-09-19 NOTE — TELEPHONE ENCOUNTER
----- Message -----   From: Kumar Timmons MD   Sent: 9/14/2022   3:10 PM CDT   To: Shannan Lott RN     Can you help arrange a pacemakerin [place]? Its hard for here to come back and forth, diagnosis sick sinus syndrome if not her family and she could come here.thanks!  mdg      === No answer on pt phone #, son's phone # is not in service.  -randy

## 2022-09-20 NOTE — TELEPHONE ENCOUNTER
---- Message -----  From: Amber Connell RN  Sent: 9/20/2022  11:28 AM CDT  To: Shannan Lott, RN, Kumar Timmons MD    Oh ok... well if she decides she cannot come in for annual pacer checks... that is OK with us too, her remotes provide appropriate data/testing but will leave as is for now then thanks!     ===View-only below this line===  ----- Message -----  From: Kumar Timmons MD  Sent: 9/20/2022  11:10 AM CDT  To: Amber Connell RN, Shannan Lott RN    She might benefit from remote checks,I think I wrote a typo I meant to request an echo form [place]  if we can arrange, Flynn Timmons